# Patient Record
Sex: MALE | Race: BLACK OR AFRICAN AMERICAN | NOT HISPANIC OR LATINO | URBAN - METROPOLITAN AREA
[De-identification: names, ages, dates, MRNs, and addresses within clinical notes are randomized per-mention and may not be internally consistent; named-entity substitution may affect disease eponyms.]

---

## 2017-01-17 ENCOUNTER — HOSPITAL ENCOUNTER (OUTPATIENT)
Dept: PALLIATIVE MEDICINE | Facility: OTHER | Age: 67
Discharge: HOME | End: 2017-01-17
Attending: NURSE PRACTITIONER

## 2017-01-17 DIAGNOSIS — Z99.2 END STAGE RENAL DISEASE ON DIALYSIS (H): ICD-10-CM

## 2017-01-17 DIAGNOSIS — N18.6 END STAGE RENAL DISEASE ON DIALYSIS (H): ICD-10-CM

## 2017-01-17 DIAGNOSIS — M79.672 BILATERAL FOOT PAIN: ICD-10-CM

## 2017-01-17 DIAGNOSIS — M54.50 LOW BACK PAIN: ICD-10-CM

## 2017-01-17 DIAGNOSIS — G89.4 CHRONIC PAIN SYNDROME: ICD-10-CM

## 2017-01-17 DIAGNOSIS — M79.671 BILATERAL FOOT PAIN: ICD-10-CM

## 2017-01-17 DIAGNOSIS — G62.9 NEUROPATHY: ICD-10-CM

## 2017-01-17 ASSESSMENT — MIFFLIN-ST. JEOR: SCORE: 1614.47

## 2017-03-21 ENCOUNTER — HOSPITAL ENCOUNTER (OUTPATIENT)
Dept: PALLIATIVE MEDICINE | Facility: OTHER | Age: 67
Discharge: HOME | End: 2017-03-21
Attending: NURSE PRACTITIONER

## 2017-03-21 DIAGNOSIS — M79.671 BILATERAL FOOT PAIN: ICD-10-CM

## 2017-03-21 DIAGNOSIS — G62.9 NEUROPATHY: ICD-10-CM

## 2017-03-21 DIAGNOSIS — G89.4 CHRONIC PAIN SYNDROME: ICD-10-CM

## 2017-03-21 DIAGNOSIS — Z99.2 END STAGE RENAL DISEASE ON DIALYSIS (H): ICD-10-CM

## 2017-03-21 DIAGNOSIS — M54.50 LOW BACK PAIN: ICD-10-CM

## 2017-03-21 DIAGNOSIS — N18.6 END STAGE RENAL DISEASE ON DIALYSIS (H): ICD-10-CM

## 2017-03-21 DIAGNOSIS — M79.672 BILATERAL FOOT PAIN: ICD-10-CM

## 2017-03-21 ASSESSMENT — MIFFLIN-ST. JEOR: SCORE: 1637.15

## 2017-05-16 ENCOUNTER — COMMUNICATION - HEALTHEAST (OUTPATIENT)
Dept: PALLIATIVE MEDICINE | Facility: OTHER | Age: 67
End: 2017-05-16

## 2017-05-16 ENCOUNTER — AMBULATORY - HEALTHEAST (OUTPATIENT)
Dept: PALLIATIVE MEDICINE | Facility: OTHER | Age: 67
End: 2017-05-16

## 2017-05-16 DIAGNOSIS — G89.4 CHRONIC PAIN SYNDROME: ICD-10-CM

## 2017-05-17 ENCOUNTER — COMMUNICATION - HEALTHEAST (OUTPATIENT)
Dept: PALLIATIVE MEDICINE | Facility: OTHER | Age: 67
End: 2017-05-17

## 2017-05-19 ENCOUNTER — COMMUNICATION - HEALTHEAST (OUTPATIENT)
Dept: PALLIATIVE MEDICINE | Facility: OTHER | Age: 67
End: 2017-05-19

## 2017-05-19 DIAGNOSIS — G89.4 CHRONIC PAIN SYNDROME: ICD-10-CM

## 2017-05-22 ENCOUNTER — COMMUNICATION - HEALTHEAST (OUTPATIENT)
Dept: PALLIATIVE MEDICINE | Facility: OTHER | Age: 67
End: 2017-05-22

## 2017-05-24 ENCOUNTER — COMMUNICATION - HEALTHEAST (OUTPATIENT)
Dept: PALLIATIVE MEDICINE | Facility: OTHER | Age: 67
End: 2017-05-24

## 2017-05-24 ENCOUNTER — HOSPITAL ENCOUNTER (OUTPATIENT)
Dept: PALLIATIVE MEDICINE | Facility: OTHER | Age: 67
Discharge: HOME | End: 2017-05-24
Attending: NURSE PRACTITIONER

## 2017-05-24 DIAGNOSIS — G89.4 CHRONIC PAIN SYNDROME: ICD-10-CM

## 2017-05-24 RX ORDER — OXYCODONE HYDROCHLORIDE 5 MG/1
5 TABLET ORAL EVERY 8 HOURS PRN
Qty: 24 TABLET | Refills: 0 | Status: SHIPPED | OUTPATIENT
Start: 2017-05-24 | End: 2017-06-01

## 2017-05-24 ASSESSMENT — MIFFLIN-ST. JEOR: SCORE: 1637.15

## 2017-05-26 ENCOUNTER — COMMUNICATION - HEALTHEAST (OUTPATIENT)
Dept: PALLIATIVE MEDICINE | Facility: OTHER | Age: 67
End: 2017-05-26

## 2017-06-05 ENCOUNTER — COMMUNICATION - HEALTHEAST (OUTPATIENT)
Dept: PALLIATIVE MEDICINE | Facility: OTHER | Age: 67
End: 2017-06-05

## 2017-06-16 ENCOUNTER — COMMUNICATION - HEALTHEAST (OUTPATIENT)
Dept: PALLIATIVE MEDICINE | Facility: OTHER | Age: 67
End: 2017-06-16

## 2017-07-17 ENCOUNTER — AMBULATORY - HEALTHEAST (OUTPATIENT)
Dept: PALLIATIVE MEDICINE | Facility: OTHER | Age: 67
End: 2017-07-17

## 2018-08-06 ENCOUNTER — COMMUNICATION - HEALTHEAST (OUTPATIENT)
Dept: PALLIATIVE MEDICINE | Facility: OTHER | Age: 68
End: 2018-08-06

## 2018-08-09 ENCOUNTER — COMMUNICATION - HEALTHEAST (OUTPATIENT)
Dept: PALLIATIVE MEDICINE | Facility: OTHER | Age: 68
End: 2018-08-09

## 2021-05-30 VITALS — HEIGHT: 72 IN | BODY MASS INDEX: 25.06 KG/M2 | WEIGHT: 185 LBS

## 2021-05-30 VITALS — BODY MASS INDEX: 25.06 KG/M2 | HEIGHT: 72 IN | WEIGHT: 185 LBS

## 2021-05-30 VITALS — HEIGHT: 72 IN | BODY MASS INDEX: 24.38 KG/M2 | WEIGHT: 180 LBS

## 2021-06-08 NOTE — PROGRESS NOTES
Subjective:   Mayco Saldana is a 67 y.o. male who presents for evaluation of pain. Patient was last seen 11/8/16.      Major issues:  1. Chronic pain syndrome    2. Bilateral foot pain    3. Low back pain    4. Neuropathy    5. End stage renal disease on dialysis        CC: Pain  See rooming evaluation    HPI:   Impact of pain treatments:   Analgesia: good   ADL's:  Household: He is getting up and dressed on his own. Social: Patient is engaged with family and friends in a meaningful fashion.  He visits family in [place]. He is planning to go back 1/19 and will be back 1/25 to visit his family. Grandson is doing well.   AE's: none   Aberrant behavior: none    Aggravating factors: unable to identify  Alleviating factors: medication, stretching walking  DME: shunts for dialysis  Location/Laterality of the pain: low back back, bilateral foot, neuropathy  Timing: constant  Quality: sharp and aching  Severity:4/10    Activities Impaired by Increasing Pain Severity: F= 7  3-Enjoy  4-Work, Enjoy  5-Active, Mood Work Enjoy  6-Sleep, Active, Mood Work Enjoy  7-Walk, Sleep, Active, Mood Work Enjoy  8-Relate, Walk, Sleep, Active, Mood Work Enjoy      Medication: Patient is taking MS Contin 30mg take 1 po q12 hours; oxycodone 15mg take 1 po q 4 hours; amitriptyline 25mg TID.  He did get his flu shot this year. Two months of scripts went well. He does like the scripts printed.     Last opioid dose was Oxycodone last taken 01/17/16 @ 600a; Morphine last taken 01/17/17 @ 1200a.      Interventional: @ weeks ago he had a new shunt created it has not yet matured.     Rehabilitation: He is doing a little exercise    Review of Systems   Constitutional- - sleep disturbances, + activity intolerance  Musculoskeletal- + pain  Neuro- - cognitive changes, - radicular, + neuropathic symptoms  Psych-  - mood disorders,  - taking medication in a fashion other than prescribed    Objective:     Vitals:    01/17/17 1000   Weight: 180 lb (81.6 kg)    Height: 6' (1.829 m)   PainSc:   4       Constitutional:  Pleasant and cooperative male who presents alone today.   Psychiatric: Mood and affect are appropriate for the situation, setting and topic of discussion.  Patient does not appear sedated.  Integumentary:  Observed skin WNL  HEENT: EOM's grossly intact.    Chest: Breathing is non-labored.   Neurological:  Alert and oriented in all spheres including: time, place, person and situation.    Assessment:   Mayco Saldana is a 67 y.o. male seen in clinic today for chronic pain LBP and neuropathy and hx of renal failure on dialysis TIW. He remains stable and engaged with self-care and family.     Plan:   Plan/NextSteps:     Please note there are 2 Christina's at the [facility]. Today you saw Christina Shook when communicating any needs please specify the last name. Thank you    Medication:   Medication prescribed today   MS Contin 30mg (fill 1/18 for use 1/22-2/21then fill 2/20 for use from 2/21-3/23  Oxycodone 15mg (fill 1/18 for use 1/22-2/21then fill 2/20 for use from 2/21-3/23    Interventional: Continue with dialysis MWF    Rehabilitation: remain active     Health Maintenance: per primary care    Records: Reviewed to assist with preparation for the office visit and are reflected throughout the note.    Follow up: 2 months      Education: Please call Monday-Friday for problems or questions and one of the clinical support staff (CSS) will help to get things figured out. The number is [phone]. Some folks are using [employer] to send and e-mail. Please remember some issues require an office visit.     Reviewed the plan of care, provided justification and answered questions with the patient.     SAFETY REMINDERS  No alcohol while taking controlled substances. Alcohol is not an illegal substance, it is unsafe to use in combination. It is a build up of substances in the body that can be extremely hazardous and may cause respirations to slow to a dangerous  rate resulting in hospitalization, brain damage, or death.    Opioid medications have been associated with sharp rise in unintentional overdose and death.  Overdose is a condition characterized by the consumption in excess of a particular drug causing adverse effects. Symptoms of overdose include: breathing slow and shallow, erratic or not at all, pinpoint pupils, hallucinations, confusion, muscle jerks, slack muscles, extreme sleepiness or loss of alertness, awake but not able to talk, face pale or clammy, vomiting, for lighter skinned people, the skin tone turns bluish purple, for darker skinned people, it turns grayish or ashen. If in a situation where overdose is a concern engage the emergency response system (dial 911).    Do not sell, loan, borrow or share your opioid medication with anyone. Deaths have occurred as a result of this practice. It is illegal and patients are being prosecuted.     *Universal Precautions:   UDS/Swab- SWAB 7/21/16. Reviewed 7/29/16. Detected morphine (expected); Detected oxycodone and metabolites (expected)  Consent/Agreement- 1/5/16  Pharmacy- as documented   - MNPMP reviewed 1/17/17 noted a small fill when in the hospital   Count- n/a  Psychological evaluation n/a  7/21/16 MME- 195  Pharmacogenetic testing- n/a    Management of opioid medication is inherently a moderate to high complex medial interaction based on the risk management required at each contact r/t risks and side effects.    Patient Arrived @ 0944 for a 1020 appointment.     TT: 1014  1031  CT: over half spent in education and counseling as outlined in the plan.    Almita Shook APRN FN-BC  [address] [facility] [address]   [phone]  [phone]

## 2021-06-09 NOTE — PROGRESS NOTES
Subjective:   Mayco Saldana is a 67 y.o. male who presents for evaluation of pain. Patient was last seen 1/17/17.      Major issues:  1. Chronic pain syndrome    2. Low back pain    3. Bilateral foot pain    4. End stage renal disease on dialysis    5. Neuropathy        CC: Pain  See rooming evaluation    HPI:   Impact of pain treatments:   Analgesia: fair  ADL's: Household: He is getting bathed, dressed and is generally functioning in the fashion he has been hoping for Social: He remains active and engaged with his family.    AE's:denies  Aberrant behavior: none    Aggravating factors: being up and walking around  Alleviating factors: medication  Associated symptoms: dialysis TIW  Location/Laterality of the pain: low back back, bilateral foot, neuropathy  Timing: constant  Quality: stabbing  Severity:5/10    Activities Impaired by Increasing Pain Severity: F= 7  3-Enjoy  4-Work, Enjoy  5-Active, Mood Work Enjoy  6-Sleep, Active, Mood Work Enjoy  7-Walk, Sleep, Active, Mood Work Enjoy  8-Relate, Walk, Sleep, Active, Mood Work Enjoy      Medication: Patient is taking MS Contin 30mg BID; oxycodone 15mg max of 6 per day; amitriptyline 25mg TID.     Last opioid dose was Oxycodone 15mg last taken 03/21/2017 @600a; Morphine 30mg ER last taken 03/21/2017 @600a.    Rehabilitation: He is interested in PT as he feels weak and would like to keep up with his grandson a bit better.     Review of Systems   Constitutional- - sleep disturbances, + activity intolerance  Allergic/Immunologic: -rash, -hives  Respiratory: -SOB  CV: -swelling/edema  Musculoskeletal- + pain  Neuro- - cognitive changes, occasional radicular, +neuropathic symptoms  HEENT-  - headache, + xerostomia  GI/-  - constipation, + urinary difficulty, - nausea, -vomiting  Psych-  - mood disorders,  - taking medication in a fashion other than prescribed    Social:  Travels every couple of months to see his grandson    Objective:     Vitals:    03/21/17 0932   BP:  105/67   Pulse: 82   Weight: 185 lb (83.9 kg)   Height: 6' (1.829 m)   PainSc:   5       Constitutional:  Pleasant and cooperative male who presents alone today.   Psychiatric: Mood and affect are appropriate for the situation, setting and topic of discussion.  Patient does not appear sedated.  Integumentary:  Observed skin WNL  HEENT: EOM's grossly intact.    Chest: Breathing is non-labored.   Neurological:  Alert and oriented in all spheres including: time, place, person and situation.    Assessment:   Mayco Saldana is a 67 y.o. male seen in clinic today for chronic pain LBP and neuropathy and hx of renal failure on dialysis TIW. He remains stable and engaged with self-care and family.    We discussed changing the MS Contin     Plan:   Plan/NextSteps:     Please note there are 2 Christina's at the [facility]. Today you saw Christina Shook when communicating any needs please specify the last name. Thank you    Medication:   Medication prescribed today MS Contin 30mg; oxycodone 15mg ( pt prefers printed)    Medication use:  3/23-4/22 (printed) then 4/22-5/22 (printed)    Rehabilitation: physical therapy ordered today at [place].     Health Maintenance: per primary care    Records: Reviewed to assist with preparation for the office visit and are reflected throughout the note.    Follow up: 2 months      Education: Please call Monday-Friday for problems or questions and one of the clinical support staff (CSS) will hel p to get things figured out. The number is [phone]. Some folks are using [employer] to send and e-mail. Please remember some issues require an office visit.     Reviewed the plan of care, provided justification and answered questions with the patient.     SAFETY REMINDERS  No alcohol while taking controlled substances. Alcohol is not an illegal substance, it is unsafe to use in combination. It is a build up of substances in the body that can be extremely hazardous and may cause respirations to  slow to a dangerous rate resulting in hospitalization, brain damage, or death.    Opioid medications have been associated with sharp rise in unintentional overdose and death.  Overdose is a condition characterized by the consumption in excess of a particular drug causing adverse effects. This can happen b/c you are sick, accidentally or intentionally took an extra dose, are on multiple medication that can interact. Someone took your medication and they are not use to the medication.  Symptoms of overdose include:   !breathing slow and shallow, erratic or not at all  !pinpoint pupils, hallucinations  !confusion  !muscle jerks, slack muscles   !extreme sleepiness or loss of alertness   !awake but not able to talk   !face pale or clammy, vomiting, for lighter skinned people, the skin tone turns bluish purple, for darker skinned people, it turns grayish or ashen   If in a situation where overdose is a concern engage the emergency response system (dial 911).    Do not sell, loan, borrow or share your opioid medication with anyone. Deaths have occurred as a result of this practice. It is illegal and patients are being prosecuted.     Prevent unexpected access/loss of medication: Keep medication locked. Only carry what you need with you.    *Universal Precautions:   UDS/Swab- SWAB 7/21/16. Reviewed 7/29/16. Detected morphine (expected); Detected oxycodone and metabolites (expected)  Consent/Agreement- 1/17/17  Pharmacy- as documented   - MNPMP 3/21/17  Count- n/a  Psychological evaluation n/a  3/21/17 MME- 195  Pharmacogenetic testing- n/a  MTM: n/a    Management of opioid medication is inherently a moderate to high complex medial interaction based on the risk management required at each contact r/t risks and side effects.    Patient Arrived @ 0920 for a 0940 appointment.     TT: 5800 - 8920  CT: over half spent in education and counseling as outlined in the plan.      Almita Shook APRN FNP-BC  [address] [facility]  [place] [address]   [phone]  [phone]
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
## 2021-06-15 PROBLEM — R06.02 SHORTNESS OF BREATH: Status: ACTIVE | Noted: 2017-01-10
